# Patient Record
Sex: FEMALE | Race: WHITE | NOT HISPANIC OR LATINO | Employment: FULL TIME | URBAN - METROPOLITAN AREA
[De-identification: names, ages, dates, MRNs, and addresses within clinical notes are randomized per-mention and may not be internally consistent; named-entity substitution may affect disease eponyms.]

---

## 2023-01-03 PROBLEM — E66.09 CLASS 1 OBESITY DUE TO EXCESS CALORIES WITHOUT SERIOUS COMORBIDITY WITH BODY MASS INDEX (BMI) OF 34.0 TO 34.9 IN ADULT: Chronic | Status: ACTIVE | Noted: 2023-01-03

## 2023-01-03 PROBLEM — E66.811 CLASS 1 OBESITY DUE TO EXCESS CALORIES WITHOUT SERIOUS COMORBIDITY WITH BODY MASS INDEX (BMI) OF 34.0 TO 34.9 IN ADULT: Chronic | Status: ACTIVE | Noted: 2023-01-03

## 2023-10-18 PROBLEM — E66.01 MORBID OBESITY: Status: ACTIVE | Noted: 2023-10-18

## 2024-10-14 LAB — VITAMIN B1, WHOLE BLOOD: 137 NMOL/L (ref 70–180)

## 2024-10-15 ENCOUNTER — TELEPHONE (OUTPATIENT)
Dept: SURGERY | Facility: CLINIC | Age: 39
End: 2024-10-15
Payer: COMMERCIAL

## 2024-10-15 DIAGNOSIS — Z13.0 SCREENING FOR IRON DEFICIENCY ANEMIA: Primary | ICD-10-CM

## 2024-10-15 NOTE — TELEPHONE ENCOUNTER
Spoke with pt regarding labs for upcoming appt  Pt recently had labs with PCP- only thing they didn't draw was CBC  Spoke with pt and placed an order for it- she will get it done before appt

## 2024-10-17 ENCOUNTER — TELEPHONE (OUTPATIENT)
Dept: GASTROENTEROLOGY | Facility: CLINIC | Age: 39
End: 2024-10-17
Payer: COMMERCIAL

## 2024-10-17 DIAGNOSIS — Z83.719 FAMILY HISTORY OF COLONIC POLYPS: Primary | ICD-10-CM

## 2024-10-17 NOTE — TELEPHONE ENCOUNTER
"[facility]  [address] [place] [address]  Phone: [phone]  Fax: [phone]    History & Physical         Provider: Dr. Dulce Calderón    Patient Name: Rae CAMARA (age):1985  39 y.o.           Gender: female   Phone: [phone]     Referring Physician: Indiana Arce     Vital Signs:   Height - 5' 5"  Weight - 160 lb  BMI -  26.63    Plan: Colonoscopy @ [facility]    Encounter Diagnosis   Name Primary?    Family history of colonic polyps Yes           History:      Past Medical History:   Diagnosis Date    ADHD (attention deficit hyperactivity disorder)     Allergies     Anemia     Depression     GERD (gastroesophageal reflux disease)     Obesity, Class II, BMI 35-39.9     Postpartum depression       Past Surgical History:   Procedure Laterality Date     SECTION      2010 and 2014     SECTION      CHOLECYSTECTOMY      DENTAL SURGERY      gum graft    fatty tissue removal      on neck     FISSURECTOMY N/A 2024    Procedure: FISSURECTOMY;  Surgeon: Camden Dykes MD;  Location: [facility] OR;  Service: Colon and Rectal;  Laterality: N/A;    HYSTERECTOMY      10/2022    LAPAROSCOPIC SLEEVE GASTRECTOMY N/A 10/17/2023    Procedure: GASTRECTOMY, SLEEVE, LAPAROSCOPIC;  Surgeon: Franco Mijares Jr., MD;  Location: [facility] OR;  Service: General;  Laterality: N/A;    WISDOM TOOTH EXTRACTION        Medication List with Changes/Refills   Current Medications    CALCIUM CARBONATE/VITAMIN D3 (CALTRATE 600 + D ORAL)    Take by mouth.    CYANOCOBALAMIN 1,000 MCG/ML INJECTION    Inject 1 mL (1,000 mcg total) into the muscle once a week.    DESVENLAFAXINE SUCCINATE (PRISTIQ) 25 MG TB24    Take 1 tablet (25 mg total) by mouth every evening.    EUCRISA 2 % OINT        METHYLPHENIDATE HCL 20 MG CDES    Take 1 tablet by mouth before evening meal.    TRIAMCINOLONE ACETONIDE 0.1% " (KENALOG) 0.1 % CREAM    Apply topically.      Review of patient's allergies indicates:  No Known Allergies   Family History   Problem Relation Name Age of Onset    Cancer Mother [employer]     Diabetes Mother [employer]          2008    Heart disease Mother [employer] droddy     Diabetes Father      Heart disease Father      No Known Problems Sister      No Known Problems Brother      No Known Problems Maternal Grandmother      No Known Problems Maternal Grandfather      Diabetes Paternal Grandmother Iza Droddy         2020    Diabetes Paternal Grandfather Neal Droddy         2001    Breast cancer Neg Hx      Colon cancer Neg Hx      Eclampsia Neg Hx      Hypertension Neg Hx      Miscarriages / Stillbirths Neg Hx      Ovarian cancer Neg Hx       labor Neg Hx      Stroke Neg Hx      Crohn's disease Neg Hx      Irritable bowel syndrome Neg Hx      Liver cancer Neg Hx      Liver disease Neg Hx      Rectal cancer Neg Hx      Stomach cancer Neg Hx      Ulcerative colitis Neg Hx        Social History     Tobacco Use    Smoking status: Never     Passive exposure: Never    Smokeless tobacco: Never   Substance Use Topics    Alcohol use: Yes     Comment: socially     Drug use: Never        Physical Examination:     General Appearance:___________________________  HEENT: _____________________________________  Abdomen:____________________________________  Heart:________________________________________  Lungs:_______________________________________  Extremities:___________________________________  Skin:_________________________________________  Endocrine:____________________________________  Genitourinary:_________________________________  Neurological:__________________________________      Patient has been evaluated immediately prior to sedation and is medically cleared for endoscopy with IVCS as an ASA class: ______      Physician Signature: _________________________       Date: ________  Time:  ________